# Patient Record
(demographics unavailable — no encounter records)

---

## 2025-02-05 NOTE — REVIEW OF SYSTEMS
[Nasal Discharge] : nasal discharge [Postnasal Drip] : postnasal drip [Cough] : cough [Itching] : Itching [Headache] : headache [Anxiety] : anxiety [Depression] : depression [Negative] : Heme/Lymph [FreeTextEntry4] : sinus pressure [FreeTextEntry6] : see hpi

## 2025-02-05 NOTE — PHYSICAL EXAM
[No Edema] : there was no peripheral edema [Normal] : affect was normal and insight and judgment were intact [de-identified] : + Max sinus tenderness, throat slightly injected. [de-identified] : has a deep cough, sounds productive

## 2025-02-05 NOTE — HISTORY OF PRESENT ILLNESS
[de-identified] : 51 yrs old female with PMHx of MS Dx at 25 yrs of age, in remission since 2008, Hx of anxiety/ Depression controlled on Lexapro, Hx of chronic intermittent idiopathic urticaria, presents to establish care with [facility] internal medicine at [place]. Of note, bahman is an old ptn of mine when I had my old private practice in [facility]. In addition, bahman is no longer on rebif, as back in October 2024 she was started on Tysabri as it was noted that she had new activity on MRI. Bahman is sick today, states she has had a cough with a drip- then had sinus pressure, with green mucus for 10 days now.-  Also has a HA and is coughing up Phlegm. no Fever, no chills.

## 2025-02-05 NOTE — ASSESSMENT
[FreeTextEntry1] : As patient is sick today will not do the full establish care visit, specially that patient is due for her physical in April 2025.  1-Sinusitis-will treat patient with Augmentin 875 mg twice daily, and Flonase. Patient can do vapor steam at home with eucalyptus oil, vitamin C, vitamin D3, and can also take oregano oil or olive leaf extract for immunity.  2-MS-patient has been in remission for many years, however unfortunately a little bit of activity has been detected a last year in October 2024 on MRI.  And patient was switched from Rebif to Tysabri by her neurologist. She is still doing fine is and is mostly symptom-free.  3-anxiety/depression-seems to be controlled on Lexapro. Patient needs a renewal today.  4-idiopathic urticaria-patient has episodes of itching without a rash.  She used to take Benadryl for it, now not taking anything, states that she just deals with it.

## 2025-05-16 NOTE — PLAN
[FreeTextEntry1] : 52-year-old female for annual physical.  Labs as ordered.  Lexapro renewed.  Mammo/Pap/colonoscopy discussed.  All questions answered.  Patient voiced understanding and in agreement to plan.  Return to clinic as recommended

## 2025-05-16 NOTE — HEALTH RISK ASSESSMENT
[Yes] : Yes [Monthly or less (1 pt)] : Monthly or less (1 point) [1 or 2 (0 pts)] : 1 or 2 (0 points) [Never (0 pts)] : Never (0 points) [Former] : Former [Patient reported mammogram was normal] : Patient reported mammogram was normal [Patient reported PAP Smear was normal] : Patient reported PAP Smear was normal [Patient reported colonoscopy was abnormal] : Patient reported colonoscopy was abnormal [Audit-CScore] : 1 [EyeExamDate] : 00/2025 [MammogramDate] : 00/2024 [PapSmearDate] : 04/2025 [ColonoscopyDate] : 00/2022

## 2025-05-16 NOTE — HISTORY OF PRESENT ILLNESS
[FreeTextEntry1] : CPE [de-identified] : 52-year-old female for annual physical.  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  History of MS.  [facility]  On [employer].  Requesting refill.